# Patient Record
Sex: MALE | Race: WHITE | NOT HISPANIC OR LATINO | Employment: FULL TIME | URBAN - METROPOLITAN AREA
[De-identification: names, ages, dates, MRNs, and addresses within clinical notes are randomized per-mention and may not be internally consistent; named-entity substitution may affect disease eponyms.]

---

## 2019-04-25 ENCOUNTER — OFFICE VISIT (OUTPATIENT)
Dept: PRIMARY CARE CLINIC | Facility: CLINIC | Age: 24
End: 2019-04-25
Payer: COMMERCIAL

## 2019-04-25 VITALS
OXYGEN SATURATION: 99 % | WEIGHT: 179 LBS | DIASTOLIC BLOOD PRESSURE: 85 MMHG | SYSTOLIC BLOOD PRESSURE: 130 MMHG | RESPIRATION RATE: 18 BRPM | TEMPERATURE: 98 F | HEIGHT: 66 IN | HEART RATE: 66 BPM | BODY MASS INDEX: 28.77 KG/M2
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
## 2019-04-25 DIAGNOSIS — S40.861A: Primary | ICD-10-CM

## 2019-04-25 PROCEDURE — 99999 PR PBB SHADOW E&M-EST. PATIENT-LVL III: CPT | Mod: PBBFAC,,, | Performed by: FAMILY MEDICINE

## 2019-04-25 PROCEDURE — 3008F PR BODY MASS INDEX (BMI) DOCUMENTED: ICD-10-PCS | Mod: CPTII,S$GLB,, | Performed by: FAMILY MEDICINE

## 2019-04-25 PROCEDURE — 99213 OFFICE O/P EST LOW 20 MIN: CPT | Mod: S$GLB,,, | Performed by: FAMILY MEDICINE

## 2019-04-25 PROCEDURE — 3008F BODY MASS INDEX DOCD: CPT | Mod: CPTII,S$GLB,, | Performed by: FAMILY MEDICINE

## 2019-04-25 PROCEDURE — 99999 PR PBB SHADOW E&M-EST. PATIENT-LVL III: ICD-10-PCS | Mod: PBBFAC,,, | Performed by: FAMILY MEDICINE

## 2019-04-25 PROCEDURE — 99213 PR OFFICE/OUTPT VISIT, EST, LEVL III, 20-29 MIN: ICD-10-PCS | Mod: S$GLB,,, | Performed by: FAMILY MEDICINE

## 2019-04-25 RX ORDER — MINOCYCLINE HYDROCHLORIDE 100 MG/1
100 CAPSULE ORAL 2 TIMES DAILY
Qty: 20 CAPSULE | Refills: 0
Start: 2019-04-25 | End: 2019-05-05

## 2019-04-25 NOTE — PROGRESS NOTES
"Subjective:       Patient ID: Salinas Wasserman is a 24 y.o. male.    Chief Complaint: Rash (right, upper arm rash/cellulitis )    Developed redness and itching to right upper arm yesterday, larger today.  Minimal discomfort.  No fevers or chills.  No known trauma, but spends lot of time outside, so thinks he could of been bitten or stung by something.  Has not taken anything for it yet.  No significant history of Infectious Disease.    Review of Systems   Constitutional: Negative for fever.   Respiratory: Negative for shortness of breath.    Cardiovascular: Negative for chest pain.   Gastrointestinal: Negative for nausea and vomiting.   Skin: Positive for color change and rash.   Allergic/Immunologic: Negative for immunocompromised state.       Objective:      Vitals:    04/25/19 1157   BP: 130/85   BP Location: Left arm   Patient Position: Sitting   BP Method: Large (Automatic)   Pulse: 66   Resp: 18   Temp: 98 °F (36.7 °C)   TempSrc: Oral   SpO2: 99%   Weight: 81.2 kg (179 lb)   Height: 5' 6" (1.676 m)     Physical Exam   Constitutional: He is oriented to person, place, and time. He appears well-developed and well-nourished.   HENT:   Head: Normocephalic and atraumatic.   Cardiovascular: Normal rate, regular rhythm and normal heart sounds.   Pulmonary/Chest: Effort normal and breath sounds normal.   Musculoskeletal: He exhibits no edema.   Neurological: He is alert and oriented to person, place, and time.   Skin: Skin is warm and dry.        Nursing note and vitals reviewed.      Assessment:       1. Insect bite of upper arm, right, initial encounter        Plan:       Insect bite of upper arm, right, initial encounter  -     minocycline (MINOCIN,DYNACIN) 100 MG capsule; Take 1 capsule (100 mg total) by mouth 2 (two) times daily. for 10 days  Dispense: 20 capsule; Refill: 0  Likely localized allergic/hypersensitivity reaction.  Advised to take Claritin/Zyrtec daily, as well as Benadryl at night for the next few " days.  Patient given prescription of minocycline with instructions to fill and start taking if he develops increasing pain, worsening redness, fever or any other concerning symptoms.    Medication List with Changes/Refills   New Medications    MINOCYCLINE (MINOCIN,DYNACIN) 100 MG CAPSULE    Take 1 capsule (100 mg total) by mouth 2 (two) times daily. for 10 days   Discontinued Medications    IBUPROFEN (ADVIL,MOTRIN) 600 MG TABLET    Take 1 tablet (600 mg total) by mouth every 6 (six) hours as needed for Pain.

## 2020-10-05 ENCOUNTER — PATIENT MESSAGE (OUTPATIENT)
Dept: ADMINISTRATIVE | Facility: HOSPITAL | Age: 25
End: 2020-10-05

## 2021-01-04 ENCOUNTER — PATIENT MESSAGE (OUTPATIENT)
Dept: ADMINISTRATIVE | Facility: HOSPITAL | Age: 26
End: 2021-01-04

## 2021-04-05 ENCOUNTER — PATIENT MESSAGE (OUTPATIENT)
Dept: ADMINISTRATIVE | Facility: HOSPITAL | Age: 26
End: 2021-04-05

## 2021-05-12 ENCOUNTER — PATIENT MESSAGE (OUTPATIENT)
Dept: RESEARCH | Facility: HOSPITAL | Age: 26
End: 2021-05-12

## 2021-07-06 ENCOUNTER — PATIENT MESSAGE (OUTPATIENT)
Dept: ADMINISTRATIVE | Facility: HOSPITAL | Age: 26
End: 2021-07-06

## 2021-10-05 ENCOUNTER — PATIENT MESSAGE (OUTPATIENT)
Dept: ADMINISTRATIVE | Facility: HOSPITAL | Age: 26
End: 2021-10-05

## 2022-01-21 ENCOUNTER — PATIENT MESSAGE (OUTPATIENT)
Dept: ADMINISTRATIVE | Facility: HOSPITAL | Age: 27
End: 2022-01-21
Payer: COMMERCIAL

## 2022-10-17 ENCOUNTER — HOSPITAL ENCOUNTER (EMERGENCY)
Facility: HOSPITAL | Age: 27
Discharge: HOME | End: 2022-10-17
Attending: STUDENT IN AN ORGANIZED HEALTH CARE EDUCATION/TRAINING PROGRAM
Payer: COMMERCIAL

## 2022-10-17 VITALS
DIASTOLIC BLOOD PRESSURE: 74 MMHG | OXYGEN SATURATION: 100 % | HEIGHT: 66 IN | SYSTOLIC BLOOD PRESSURE: 130 MMHG | WEIGHT: 200 LBS | RESPIRATION RATE: 18 BRPM | TEMPERATURE: 98 F | BODY MASS INDEX: 32.14 KG/M2 | HEART RATE: 70 BPM

## 2022-10-17 DIAGNOSIS — R31.9 HEMATURIA, UNSPECIFIED TYPE: ICD-10-CM

## 2022-10-17 DIAGNOSIS — N20.0 KIDNEY STONE: Primary | ICD-10-CM

## 2022-10-17 DIAGNOSIS — R10.9 FLANK PAIN: ICD-10-CM

## 2022-10-17 LAB
ALBUMIN SERPL-MCNC: 4.9 GM/DL (ref 3.5–5)
ALBUMIN/GLOB SERPL: 1.7 RATIO (ref 1.1–2)
ALP SERPL-CCNC: 48 UNIT/L (ref 40–150)
ALT SERPL-CCNC: 40 UNIT/L (ref 0–55)
APPEARANCE UR: ABNORMAL
AST SERPL-CCNC: 23 UNIT/L (ref 5–34)
BACTERIA #/AREA URNS AUTO: ABNORMAL /HPF
BASOPHILS # BLD AUTO: 0.06 X10(3)/MCL (ref 0–0.2)
BASOPHILS NFR BLD AUTO: 0.6 %
BILIRUB UR QL STRIP.AUTO: NEGATIVE MG/DL
BILIRUBIN DIRECT+TOT PNL SERPL-MCNC: 0.9 MG/DL
BUN SERPL-MCNC: 13.7 MG/DL (ref 8.9–20.6)
C TRACH DNA SPEC QL NAA+PROBE: NOT DETECTED
CALCIUM SERPL-MCNC: 10.1 MG/DL (ref 8.4–10.2)
CHLORIDE SERPL-SCNC: 107 MMOL/L (ref 98–107)
CO2 SERPL-SCNC: 22 MMOL/L (ref 22–29)
COLOR UR AUTO: YELLOW
CREAT SERPL-MCNC: 1.27 MG/DL (ref 0.73–1.18)
EOSINOPHIL # BLD AUTO: 0.06 X10(3)/MCL (ref 0–0.9)
EOSINOPHIL NFR BLD AUTO: 0.6 %
ERYTHROCYTE [DISTWIDTH] IN BLOOD BY AUTOMATED COUNT: 12.1 % (ref 11.5–17)
GFR SERPLBLD CREATININE-BSD FMLA CKD-EPI: >60 MLS/MIN/1.73/M2
GLOBULIN SER-MCNC: 2.9 GM/DL (ref 2.4–3.5)
GLUCOSE SERPL-MCNC: 113 MG/DL (ref 74–100)
GLUCOSE UR QL STRIP.AUTO: NEGATIVE MG/DL
HCT VFR BLD AUTO: 46.2 % (ref 42–52)
HGB BLD-MCNC: 16.5 GM/DL (ref 14–18)
IMM GRANULOCYTES # BLD AUTO: 0.02 X10(3)/MCL (ref 0–0.04)
IMM GRANULOCYTES NFR BLD AUTO: 0.2 %
KETONES UR QL STRIP.AUTO: NEGATIVE MG/DL
LEUKOCYTE ESTERASE UR QL STRIP.AUTO: NEGATIVE UNIT/L
LIPASE SERPL-CCNC: 16 U/L
LYMPHOCYTES # BLD AUTO: 1.61 X10(3)/MCL (ref 0.6–4.6)
LYMPHOCYTES NFR BLD AUTO: 15.9 %
MCH RBC QN AUTO: 30.1 PG (ref 27–31)
MCHC RBC AUTO-ENTMCNC: 35.7 MG/DL (ref 33–36)
MCV RBC AUTO: 84.2 FL (ref 80–94)
MONOCYTES # BLD AUTO: 0.61 X10(3)/MCL (ref 0.1–1.3)
MONOCYTES NFR BLD AUTO: 6 %
N GONORRHOEA DNA SPEC QL NAA+PROBE: NOT DETECTED
NEUTROPHILS # BLD AUTO: 7.8 X10(3)/MCL (ref 2.1–9.2)
NEUTROPHILS NFR BLD AUTO: 76.7 %
NITRITE UR QL STRIP.AUTO: NEGATIVE
NRBC BLD AUTO-RTO: 0 %
PH UR STRIP.AUTO: 8 [PH]
PLATELET # BLD AUTO: 201 X10(3)/MCL (ref 130–400)
PMV BLD AUTO: 10.4 FL (ref 7.4–10.4)
POTASSIUM SERPL-SCNC: 4.6 MMOL/L (ref 3.5–5.1)
PROT SERPL-MCNC: 7.8 GM/DL (ref 6.4–8.3)
PROT UR QL STRIP.AUTO: ABNORMAL MG/DL
RBC # BLD AUTO: 5.49 X10(6)/MCL (ref 4.7–6.1)
RBC #/AREA URNS AUTO: 214 /HPF
RBC UR QL AUTO: ABNORMAL UNIT/L
SODIUM SERPL-SCNC: 137 MMOL/L (ref 136–145)
SP GR UR STRIP.AUTO: 1.02 (ref 1–1.03)
SQUAMOUS #/AREA URNS AUTO: <5 /HPF
UROBILINOGEN UR STRIP-ACNC: 0.2 MG/DL
WBC # SPEC AUTO: 10.1 X10(3)/MCL (ref 4.5–11.5)
WBC #/AREA URNS AUTO: <5 /HPF

## 2022-10-17 PROCEDURE — 83690 ASSAY OF LIPASE: CPT | Performed by: PHYSICIAN ASSISTANT

## 2022-10-17 PROCEDURE — 80053 COMPREHEN METABOLIC PANEL: CPT | Performed by: PHYSICIAN ASSISTANT

## 2022-10-17 PROCEDURE — 25000003 PHARM REV CODE 250: Performed by: PHYSICIAN ASSISTANT

## 2022-10-17 PROCEDURE — 63600175 PHARM REV CODE 636 W HCPCS: Performed by: PHYSICIAN ASSISTANT

## 2022-10-17 PROCEDURE — 87491 CHLMYD TRACH DNA AMP PROBE: CPT | Performed by: PHYSICIAN ASSISTANT

## 2022-10-17 PROCEDURE — 87591 N.GONORRHOEAE DNA AMP PROB: CPT | Performed by: PHYSICIAN ASSISTANT

## 2022-10-17 PROCEDURE — 96375 TX/PRO/DX INJ NEW DRUG ADDON: CPT

## 2022-10-17 PROCEDURE — 96374 THER/PROPH/DIAG INJ IV PUSH: CPT

## 2022-10-17 PROCEDURE — 85025 COMPLETE CBC W/AUTO DIFF WBC: CPT | Performed by: PHYSICIAN ASSISTANT

## 2022-10-17 PROCEDURE — 81001 URINALYSIS AUTO W/SCOPE: CPT | Performed by: PHYSICIAN ASSISTANT

## 2022-10-17 PROCEDURE — 36415 COLL VENOUS BLD VENIPUNCTURE: CPT | Performed by: PHYSICIAN ASSISTANT

## 2022-10-17 PROCEDURE — 96361 HYDRATE IV INFUSION ADD-ON: CPT

## 2022-10-17 PROCEDURE — 99285 EMERGENCY DEPT VISIT HI MDM: CPT | Mod: 25

## 2022-10-17 PROCEDURE — 63600175 PHARM REV CODE 636 W HCPCS: Performed by: STUDENT IN AN ORGANIZED HEALTH CARE EDUCATION/TRAINING PROGRAM

## 2022-10-17 RX ORDER — ONDANSETRON 4 MG/1
4 TABLET, ORALLY DISINTEGRATING ORAL EVERY 12 HOURS PRN
Qty: 10 TABLET | Refills: 0 | Status: SHIPPED | OUTPATIENT
Start: 2022-10-17 | End: 2022-10-22

## 2022-10-17 RX ORDER — ONDANSETRON 2 MG/ML
4 INJECTION INTRAMUSCULAR; INTRAVENOUS
Status: COMPLETED | OUTPATIENT
Start: 2022-10-17 | End: 2022-10-17

## 2022-10-17 RX ORDER — MORPHINE SULFATE 4 MG/ML
4 INJECTION, SOLUTION INTRAMUSCULAR; INTRAVENOUS
Status: COMPLETED | OUTPATIENT
Start: 2022-10-17 | End: 2022-10-17

## 2022-10-17 RX ORDER — HYDROCODONE BITARTRATE AND ACETAMINOPHEN 5; 325 MG/1; MG/1
1 TABLET ORAL EVERY 8 HOURS PRN
Qty: 15 TABLET | Refills: 0 | Status: SHIPPED | OUTPATIENT
Start: 2022-10-17 | End: 2022-10-22

## 2022-10-17 RX ORDER — TAMSULOSIN HYDROCHLORIDE 0.4 MG/1
0.4 CAPSULE ORAL DAILY
Qty: 30 CAPSULE | Refills: 0 | Status: SHIPPED | OUTPATIENT
Start: 2022-10-17 | End: 2023-05-16

## 2022-10-17 RX ORDER — KETOROLAC TROMETHAMINE 30 MG/ML
30 INJECTION, SOLUTION INTRAMUSCULAR; INTRAVENOUS
Status: COMPLETED | OUTPATIENT
Start: 2022-10-17 | End: 2022-10-17

## 2022-10-17 RX ORDER — KETOROLAC TROMETHAMINE 10 MG/1
10 TABLET, FILM COATED ORAL EVERY 8 HOURS PRN
Qty: 15 TABLET | Refills: 0 | Status: SHIPPED | OUTPATIENT
Start: 2022-10-17 | End: 2022-10-22

## 2022-10-17 RX ADMIN — KETOROLAC TROMETHAMINE 30 MG: 30 INJECTION, SOLUTION INTRAMUSCULAR at 10:10

## 2022-10-17 RX ADMIN — MORPHINE SULFATE 4 MG: 4 INJECTION INTRAVENOUS at 11:10

## 2022-10-17 RX ADMIN — SODIUM CHLORIDE 1000 ML: 9 INJECTION, SOLUTION INTRAVENOUS at 10:10

## 2022-10-17 RX ADMIN — ONDANSETRON 4 MG: 2 INJECTION INTRAMUSCULAR; INTRAVENOUS at 10:10

## 2022-10-17 RX ADMIN — SODIUM CHLORIDE, POTASSIUM CHLORIDE, SODIUM LACTATE AND CALCIUM CHLORIDE 1000 ML: 600; 310; 30; 20 INJECTION, SOLUTION INTRAVENOUS at 12:10

## 2022-10-17 NOTE — ED PROVIDER NOTES
Encounter Date: 10/17/2022    SCRIBE #1 NOTE: I, Bhumika Rose, am scribing for, and in the presence of,  Marlo Bennett MD. I have scribed the following portions of the note - Other sections scribed: HPI, ROS, PE.     History     Chief Complaint   Patient presents with    Abdominal Pain     C/o RUQ abdominal pain and right flank pain that began at 0300. Reports decreased urine output, right scrotal pressure, and nausea. Pt denies vomiting.      27 year old male presents to the ED for right flank and right upper quadrant abdominal pain that began this morning around 0300. The patient reports associated nausea and vomiting, but denies hematuria. He does not have a history of kidney stones.     The history is provided by the patient. No  was used.   Abdominal Pain  Illness onset: 0300. The abdominal pain is located in the RUQ. The abdominal pain radiates to the right flank. The abdominal pain is relieved by nothing. The other symptoms of the illness include nausea and vomiting. The other symptoms of the illness do not include fever, shortness of breath or dysuria.   The emesis contains stomach contents.   Symptoms associated with the illness do not include hematuria.   Review of patient's allergies indicates:  No Known Allergies  History reviewed. No pertinent past medical history.  History reviewed. No pertinent surgical history.  Family History   Problem Relation Age of Onset    Stroke Father     Hypertension Father     Stroke Paternal Grandmother      Social History     Tobacco Use    Smoking status: Some Days     Packs/day: 0.50     Types: Cigarettes    Smokeless tobacco: Never   Substance Use Topics    Alcohol use: Yes     Alcohol/week: 2.0 - 3.0 standard drinks     Types: 2 - 3 Cans of beer per week     Comment: social     Drug use: No     Review of Systems   Constitutional:  Negative for fever.   HENT:  Negative for sore throat.    Eyes:  Negative for visual disturbance.   Respiratory:   Negative for shortness of breath.    Cardiovascular:  Negative for chest pain.   Gastrointestinal:  Positive for abdominal pain, nausea and vomiting.   Genitourinary:  Negative for dysuria and hematuria.   Musculoskeletal:  Negative for joint swelling.   Skin:  Negative for rash.   Neurological:  Negative for weakness.   Psychiatric/Behavioral:  Negative for confusion.    All other systems reviewed and are negative.    Physical Exam     Initial Vitals [10/17/22 1027]   BP Pulse Resp Temp SpO2   (!) 149/93 (!) 56 20 97.9 °F (36.6 °C) 99 %      MAP       --         Physical Exam    Nursing note and vitals reviewed.  Constitutional: He appears well-developed and well-nourished. No distress.   HENT:   Head: Normocephalic and atraumatic.   Nose: Nose normal.   Mouth/Throat: Oropharynx is clear and moist.   Eyes: Conjunctivae and EOM are normal. Pupils are equal, round, and reactive to light.   Neck: Neck supple. No tracheal deviation present.   Normal range of motion.  Cardiovascular:  Normal rate, regular rhythm, normal heart sounds and intact distal pulses.           No murmur heard.  Pulmonary/Chest: Breath sounds normal. No stridor. No respiratory distress. He has no wheezes. He has no rhonchi. He has no rales. He exhibits no tenderness.   Abdominal: Abdomen is soft. Bowel sounds are normal. He exhibits no distension and no mass. There is no abdominal tenderness. There is no rebound and no guarding.   Genitourinary:    Genitourinary Comments: No testicular pain or swelling, no erythema      Musculoskeletal:         General: No edema. Normal range of motion.      Cervical back: Normal range of motion and neck supple.      Comments: Right flank tenderness      Neurological: He is alert and oriented to person, place, and time. He has normal strength. No cranial nerve deficit or sensory deficit. GCS score is 15. GCS eye subscore is 4. GCS verbal subscore is 5. GCS motor subscore is 6.   Skin: Skin is warm and dry.  Capillary refill takes less than 2 seconds. No rash noted. No pallor.   Psychiatric: He has a normal mood and affect. His behavior is normal. Judgment and thought content normal.       ED Course   Procedures  Labs Reviewed   COMPREHENSIVE METABOLIC PANEL - Abnormal; Notable for the following components:       Result Value    Glucose Level 113 (*)     Creatinine 1.27 (*)     All other components within normal limits   URINALYSIS, REFLEX TO URINE CULTURE - Abnormal; Notable for the following components:    Appearance, UA Turbid (*)     Protein, UA Trace (*)     Blood, UA 3+ (*)     All other components within normal limits   URINALYSIS, MICROSCOPIC - Abnormal; Notable for the following components:    RBC,  (*)     All other components within normal limits   CHLAMYDIA/GONORRHOEAE(GC), PCR - Normal    Narrative:     The Xpert CT/NG test, performed on the [employer] system is a qualitative in vitro real-time polymerase chain reaction (PCR) test for the automated detected and differentiation for genomic DNA from Chlamydia trachomatis (CT) and/or Neisseria gonorrhoeae (NG).   LIPASE - Normal   CBC W/ AUTO DIFFERENTIAL    Narrative:     The following orders were created for panel order CBC auto differential.  Procedure                               Abnormality         Status                     ---------                               -----------         ------                     CBC with Differential[[id number]]                            Final result                 Please view results for these tests on the individual orders.   CBC WITH DIFFERENTIAL          Imaging Results              CT Abdomen Pelvis  Without Contrast (Final result)  Result time 10/17/22 13:55:58      Final result by Marti Pendleton MD (10/17/22 13:55:58)                   Impression:      2 mm calculus at the right UVJ with mild right hydronephrosis and inflammatory changes.      Electronically signed by: Marti  Helder  Date:    10/17/2022  Time:    13:55               Narrative:    EXAMINATION:  CT ABDOMEN PELVIS WITHOUT CONTRAST    CLINICAL HISTORY:  Flank pain, kidney stone;    TECHNIQUE:  CT imaging was performed of the abdomen and pelvis without intravenous contrast. Dose length product is 370 mGycm. Automatic exposure control, adjustment of mA/kV or iterative reconstruction technique was used to limit radiation dose.    COMPARISON:  None    FINDINGS:  Assessment of the visceral organs and vasculature is limited by the lack of IV contrast.    Liver/biliary: No concerning hepatic findings. No radiodense gallstone or biliary dilatation appreciated.    Pancreas: Normal.    Spleen: Normal.    Adrenals: Normal.    Kidneys, ureters and bladder: There is a 2 mm calculus at the right ureterovesicular junction.  There is mild right hydronephrosis and ureteral dilatation with perinephric inflammatory changes.  The bladder is unremarkable.    Reproductive organs: No pelvic masses.    Stomach/bowel: No evidence of bowel obstruction. Normal appendix. No discernible bowel inflammation.    Lymph nodes: No pathologically enlarged lymph node identified with noncontrast technique.    Peritoneum: No ascites or free air.    Vessels: Normal abdominal aortic caliber.    Abdominal wall: Normal.    Lung bases: No consolidation or pleural effusion.    Bones: No acute osseous findings.                                       Medications   sodium chloride 0.9% bolus 1,000 mL (0 mLs Intravenous Stopped 10/17/22 1145)   ondansetron injection 4 mg (4 mg Intravenous Given by Other 10/17/22 1045)   ketorolac injection 30 mg (30 mg Intravenous Given by Other 10/17/22 1045)   morphine injection 4 mg (4 mg Intravenous Given 10/17/22 1159)   lactated ringers bolus 1,000 mL (1,000 mLs Intravenous New Bag 10/17/22 1200)     Medical Decision Making:   Clinical Tests:   Lab Tests: Ordered and Reviewed  Radiological Study: Reviewed and Ordered  ED  Management:  Patient is a 26 y/o male presents for R flank pain.  See HPI/exam.  Labs/imaging as noted.  Kidney stone noted.  Reassessed patient.  Patient is resting comfortably.  Discussed all results.  Discussed need for follow-up with urology.  Discussed return precautions.  Answered all questions at this time.  Hemodynamically stable for continued outpatient management with strict return precautions.  Patient and family verbalized understanding agreed to plan.          Scribe Attestation:   Scribe #1: I performed the above scribed service and the documentation accurately describes the services I performed. I attest to the accuracy of the note.    Attending Attestation:           Physician Attestation for Scribe:  Physician Attestation Statement for Scribe #1: I, reviewed documentation, as scribed by Bhumika Rose in my presence, and it is both accurate and complete.                        Clinical Impression:   Final diagnoses:  [N20.0] Kidney stone (Primary)  [R10.9] Flank pain  [R31.9] Hematuria, unspecified type        ED Disposition Condition    Discharge Stable          ED Prescriptions       Medication Sig Dispense Start Date End Date Auth. Provider    tamsulosin (FLOMAX) 0.4 mg Cap Take 1 capsule (0.4 mg total) by mouth once daily. 30 capsule 10/17/2022 2022 Marlo Bennett MD    ketorolac (TORADOL) 10 mg tablet () Take 1 tablet (10 mg total) by mouth every 8 (eight) hours as needed for Pain. 15 tablet 10/17/2022 10/22/2022 Marlo Bennett MD    ondansetron (ZOFRAN-ODT) 4 MG TbDL () Take 1 tablet (4 mg total) by mouth every 12 (twelve) hours as needed. 10 tablet 10/17/2022 10/22/2022 Marlo Bennett MD    HYDROcodone-acetaminophen (NORCO) 5-325 mg per tablet () Take 1 tablet by mouth every 8 (eight) hours as needed for Pain. 15 tablet 10/17/2022 10/22/2022 Marlo Bennett MD          Follow-up Information       Follow up With Specialties Details Why Contact Info    Nichol Crowe  MD Family Medicine Schedule an appointment as soon as possible for a visit in 1 day  [address]  [address]  [facility] [place] [facility] [address]  [phone]      Your primary care physician.                 Marlo Bennett MD  10/31/22 0963

## 2022-10-17 NOTE — FIRST PROVIDER EVALUATION
"Medical screening examination initiated.  I have conducted a focused provider triage encounter, findings are as follows:    Chief Complaint   Patient presents with    Abdominal Pain     C/o RUQ abdominal pain and right flank pain that began at 0300. Reports decreased urine output, right scrotal pressure, and nausea. Pt denies vomiting.      Brief history of present illness:  27 y.o. male presents to the ED with right flank pain radiating to RLQ pain onset 0300 this morning with associated nausea. Notes discomfort in his testicle as well (L>R).  Denies dysuria, hematuria, vomiting. No history of kidney stones.     Vitals:    10/17/22 1027   BP: (!) 149/93   Pulse: (!) 56   Resp: 20   Temp: 97.9 °F (36.6 °C)   SpO2: 99%   Weight: 90.7 kg (200 lb)   Height: 5' 6" (1.676 m)     Pertinent physical exam: Awake, alert, ambulatory, non-labored respirations    Brief workup plan:  labs, UA, US    Preliminary workup initiated; this workup will be continued and followed by the physician or advanced practice provider that is assigned to the patient when roomed.  "

## 2022-10-17 NOTE — DISCHARGE INSTRUCTIONS
Follow up with your primary care physician.      Return to the with urology.      Return to the emergency department given worsening pain, nausea, vomiting, difficulty breathing, fever, or any other symptoms.

## 2023-05-09 DIAGNOSIS — Z00.00 GENERAL MEDICAL EXAM: Primary | ICD-10-CM

## 2023-05-16 ENCOUNTER — CLINICAL SUPPORT (OUTPATIENT)
Dept: INTERNAL MEDICINE | Facility: CLINIC | Age: 28
End: 2023-05-16
Payer: COMMERCIAL

## 2023-05-16 VITALS
OXYGEN SATURATION: 100 % | SYSTOLIC BLOOD PRESSURE: 133 MMHG | DIASTOLIC BLOOD PRESSURE: 89 MMHG | TEMPERATURE: 98 F | HEART RATE: 71 BPM | RESPIRATION RATE: 19 BRPM

## 2023-05-16 DIAGNOSIS — J30.9 ALLERGIC RHINITIS, UNSPECIFIED SEASONALITY, UNSPECIFIED TRIGGER: ICD-10-CM

## 2023-05-16 DIAGNOSIS — R06.83 SNORING: ICD-10-CM

## 2023-05-16 DIAGNOSIS — R21 RASH AND NONSPECIFIC SKIN ERUPTION: ICD-10-CM

## 2023-05-16 DIAGNOSIS — R12 HEART BURN: ICD-10-CM

## 2023-05-16 DIAGNOSIS — Z00.00 GENERAL MEDICAL EXAM: ICD-10-CM

## 2023-05-16 DIAGNOSIS — F17.210 CIGARETTE SMOKER: ICD-10-CM

## 2023-05-16 DIAGNOSIS — J34.2 DEVIATED NASAL SEPTUM: ICD-10-CM

## 2023-05-16 LAB
(HCYS)2 SERPL-MCNC: 9.2 UMOL/L (ref 5.1–15.4)
ALBUMIN SERPL-MCNC: 4.1 G/DL (ref 3.5–5)
ALBUMIN/GLOB SERPL: 1.3 RATIO (ref 1.1–2)
ALP SERPL-CCNC: 39 UNIT/L (ref 40–150)
ALT SERPL-CCNC: 47 UNIT/L (ref 0–55)
APPEARANCE UR: CLEAR
AST SERPL-CCNC: 25 UNIT/L (ref 5–34)
BACTERIA #/AREA URNS AUTO: ABNORMAL /HPF
BASOPHILS # BLD AUTO: 0.06 X10(3)/MCL
BASOPHILS NFR BLD AUTO: 1 %
BILIRUB UR QL STRIP.AUTO: NEGATIVE MG/DL
BILIRUBIN DIRECT+TOT PNL SERPL-MCNC: 0.6 MG/DL
BUN SERPL-MCNC: 12.1 MG/DL (ref 8.9–20.6)
CALCIUM SERPL-MCNC: 10 MG/DL (ref 8.4–10.2)
CHLORIDE SERPL-SCNC: 103 MMOL/L (ref 98–107)
CHOLEST SERPL-MCNC: 214 MG/DL
CHOLEST/HDLC SERPL: 5 {RATIO} (ref 0–5)
CK SERPL-CCNC: 80 U/L (ref 30–200)
CO2 SERPL-SCNC: 26 MMOL/L (ref 22–29)
COLOR UR: ABNORMAL
CREAT SERPL-MCNC: 1.03 MG/DL (ref 0.73–1.18)
CRP SERPL HS-MCNC: 1.09 MG/L
DEPRECATED CALCIDIOL+CALCIFEROL SERPL-MC: 90.8 NG/ML (ref 30–80)
DLCO: NORMAL
EOSINOPHIL # BLD AUTO: 0.2 X10(3)/MCL (ref 0–0.9)
EOSINOPHIL NFR BLD AUTO: 3.3 %
ERYTHROCYTE [DISTWIDTH] IN BLOOD BY AUTOMATED COUNT: 12.2 % (ref 11.5–17)
EST. AVERAGE GLUCOSE BLD GHB EST-MCNC: 91.1 MG/DL
FERRITIN SERPL-MCNC: 115.99 NG/ML (ref 21.81–274.66)
FEV1/FVC: 81.2 %
FEV1: 3.5 LITERS
FVC: 34.31 LITERS
GFR SERPLBLD CREATININE-BSD FMLA CKD-EPI: >60 MLS/MIN/1.73/M2
GLOBULIN SER-MCNC: 3.2 GM/DL (ref 2.4–3.5)
GLUCOSE SERPL-MCNC: 88 MG/DL (ref 74–100)
GLUCOSE UR QL STRIP.AUTO: NORMAL MG/DL
GROUP & RH: NORMAL
HBA1C MFR BLD: 4.8 %
HCT VFR BLD AUTO: 43.9 % (ref 42–52)
HCV AB SERPL QL IA: NONREACTIVE
HDLC SERPL-MCNC: 40 MG/DL (ref 35–60)
HGB BLD-MCNC: 15.5 G/DL (ref 14–18)
HIV 1+2 AB+HIV1 P24 AG SERPL QL IA: NONREACTIVE
HYALINE CASTS #/AREA URNS LPF: ABNORMAL /LPF
IMM GRANULOCYTES # BLD AUTO: 0.01 X10(3)/MCL (ref 0–0.04)
IMM GRANULOCYTES NFR BLD AUTO: 0.2 %
KETONES UR QL STRIP.AUTO: NEGATIVE MG/DL
LDLC SERPL CALC-MCNC: 143 MG/DL (ref 50–140)
LEUKOCYTE ESTERASE UR QL STRIP.AUTO: NEGATIVE UNIT/L
LYMPHOCYTES # BLD AUTO: 2.88 X10(3)/MCL (ref 0.6–4.6)
LYMPHOCYTES NFR BLD AUTO: 48.1 %
MCH RBC QN AUTO: 29.8 PG (ref 27–31)
MCHC RBC AUTO-ENTMCNC: 35.3 G/DL (ref 33–36)
MCV RBC AUTO: 84.3 FL (ref 80–94)
MONOCYTES # BLD AUTO: 0.44 X10(3)/MCL (ref 0.1–1.3)
MONOCYTES NFR BLD AUTO: 7.3 %
MUCOUS THREADS URNS QL MICRO: ABNORMAL /LPF
NEUTROPHILS # BLD AUTO: 2.4 X10(3)/MCL (ref 2.1–9.2)
NEUTROPHILS NFR BLD AUTO: 40.1 %
NITRITE UR QL STRIP.AUTO: NEGATIVE
NRBC BLD AUTO-RTO: 0 %
PH UR STRIP.AUTO: 5.5 [PH]
PLATELET # BLD AUTO: 168 X10(3)/MCL (ref 130–400)
PMV BLD AUTO: 10.9 FL (ref 7.4–10.4)
POTASSIUM SERPL-SCNC: 4.4 MMOL/L (ref 3.5–5.1)
PROT SERPL-MCNC: 7.3 GM/DL (ref 6.4–8.3)
PROT UR QL STRIP.AUTO: NEGATIVE MG/DL
PSA SERPL-MCNC: 0.18 NG/ML
RBC # BLD AUTO: 5.21 X10(6)/MCL (ref 4.7–6.1)
RBC #/AREA URNS AUTO: ABNORMAL /HPF
RBC UR QL AUTO: NEGATIVE UNIT/L
SODIUM SERPL-SCNC: 137 MMOL/L (ref 136–145)
SP GR UR STRIP.AUTO: 1.02
SQUAMOUS #/AREA URNS LPF: ABNORMAL /HPF
T3FREE SERPL-MCNC: 3.29 PG/ML (ref 1.57–3.91)
T4 FREE SERPL-MCNC: 1.41 NG/DL (ref 0.7–1.48)
TESTOST SERPL-MCNC: 829.05 NG/DL (ref 240.24–870.68)
TLC: NORMAL
TRIGL SERPL-MCNC: 156 MG/DL (ref 34–140)
TSH SERPL-ACNC: 1.25 UIU/ML (ref 0.35–4.94)
URATE SERPL-MCNC: 6.4 MG/DL (ref 3.5–7.2)
UROBILINOGEN UR STRIP-ACNC: NORMAL MG/DL
VLDLC SERPL CALC-MCNC: 31 MG/DL
WBC # SPEC AUTO: 5.99 X10(3)/MCL (ref 4.5–11.5)
WBC #/AREA URNS AUTO: ABNORMAL /HPF

## 2023-05-16 PROCEDURE — 0358T BIA WHOLE BODY: CPT | Mod: ,,, | Performed by: FAMILY MEDICINE

## 2023-05-16 PROCEDURE — 87389 HIV-1 AG W/HIV-1&-2 AB AG IA: CPT

## 2023-05-16 PROCEDURE — 84481 FREE ASSAY (FT-3): CPT

## 2023-05-16 PROCEDURE — 80061 LIPID PANEL: CPT

## 2023-05-16 PROCEDURE — 94010 BREATHING CAPACITY TEST: ICD-10-PCS | Mod: ,,, | Performed by: FAMILY MEDICINE

## 2023-05-16 PROCEDURE — 99499 UNLISTED E&M SERVICE: CPT | Mod: ,,, | Performed by: FAMILY MEDICINE

## 2023-05-16 PROCEDURE — 84439 ASSAY OF FREE THYROXINE: CPT

## 2023-05-16 PROCEDURE — 82728 ASSAY OF FERRITIN: CPT

## 2023-05-16 PROCEDURE — 93005 EKG 12-LEAD: ICD-10-PCS | Mod: 59,,, | Performed by: FAMILY MEDICINE

## 2023-05-16 PROCEDURE — 85025 COMPLETE CBC W/AUTO DIFF WBC: CPT

## 2023-05-16 PROCEDURE — 36415 COLL VENOUS BLD VENIPUNCTURE: CPT | Mod: ,,, | Performed by: FAMILY MEDICINE

## 2023-05-16 PROCEDURE — 86900 BLOOD TYPING SEROLOGIC ABO: CPT | Performed by: FAMILY MEDICINE

## 2023-05-16 PROCEDURE — 36415 PR COLLECTION VENOUS BLOOD,VENIPUNCTURE: ICD-10-PCS | Mod: ,,, | Performed by: FAMILY MEDICINE

## 2023-05-16 PROCEDURE — 93015 EXERCISE STRESS - EKG (CUPID ONLY): ICD-10-PCS | Mod: ,,, | Performed by: FAMILY MEDICINE

## 2023-05-16 PROCEDURE — 83090 ASSAY OF HOMOCYSTEINE: CPT

## 2023-05-16 PROCEDURE — 86141 C-REACTIVE PROTEIN HS: CPT

## 2023-05-16 PROCEDURE — 84403 ASSAY OF TOTAL TESTOSTERONE: CPT

## 2023-05-16 PROCEDURE — 84550 ASSAY OF BLOOD/URIC ACID: CPT

## 2023-05-16 PROCEDURE — 83036 HEMOGLOBIN GLYCOSYLATED A1C: CPT

## 2023-05-16 PROCEDURE — 84153 ASSAY OF PSA TOTAL: CPT

## 2023-05-16 PROCEDURE — 81001 URINALYSIS AUTO W/SCOPE: CPT

## 2023-05-16 PROCEDURE — 82306 VITAMIN D 25 HYDROXY: CPT

## 2023-05-16 PROCEDURE — 92551 HEARING SCREENING: ICD-10-PCS | Mod: ,,, | Performed by: FAMILY MEDICINE

## 2023-05-16 PROCEDURE — 93015 CV STRESS TEST SUPVJ I&R: CPT | Mod: ,,, | Performed by: FAMILY MEDICINE

## 2023-05-16 PROCEDURE — 99499 PR EXECUTIVE HEALTH VISIT WITH INSURANCE: ICD-10-PCS | Mod: ,,, | Performed by: FAMILY MEDICINE

## 2023-05-16 PROCEDURE — 0358T PR BIOELECTRICAL IMPEDANCE WHOLE BODY COMPOSITION ASSESSMENT W/I&R: ICD-10-PCS | Mod: ,,, | Performed by: FAMILY MEDICINE

## 2023-05-16 PROCEDURE — 82550 ASSAY OF CK (CPK): CPT

## 2023-05-16 PROCEDURE — 92551 PURE TONE HEARING TEST AIR: CPT | Mod: ,,, | Performed by: FAMILY MEDICINE

## 2023-05-16 PROCEDURE — 93010 EKG 12-LEAD: ICD-10-PCS | Mod: 59,,, | Performed by: FAMILY MEDICINE

## 2023-05-16 PROCEDURE — 93005 ELECTROCARDIOGRAM TRACING: CPT | Mod: 59,,, | Performed by: FAMILY MEDICINE

## 2023-05-16 PROCEDURE — 94010 BREATHING CAPACITY TEST: CPT | Mod: ,,, | Performed by: FAMILY MEDICINE

## 2023-05-16 PROCEDURE — 80053 COMPREHEN METABOLIC PANEL: CPT

## 2023-05-16 PROCEDURE — 86803 HEPATITIS C AB TEST: CPT

## 2023-05-16 PROCEDURE — 93010 ELECTROCARDIOGRAM REPORT: CPT | Mod: 59,,, | Performed by: FAMILY MEDICINE

## 2023-05-16 PROCEDURE — 84443 ASSAY THYROID STIM HORMONE: CPT

## 2023-05-16 NOTE — ASSESSMENT & PLAN NOTE
Weight loss should help resolve this issue. Consider a sleep study if your symptoms do not improve or worsen.

## 2023-05-16 NOTE — ASSESSMENT & PLAN NOTE
Consider returning to Dr. Erwin's office for further evaluation and treatment of your unknown rash.

## 2023-05-16 NOTE — PROGRESS NOTES
Subjective      Salinas Wasserman is a 28 y.o. year old male, who presents today for a preventive medical evaluation.      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   He states that up until last year he has had recurrent sinus infections which twice resulted in a pneumonia. Since July of 2022 he has been taking daily multivitamins and an antihistamine. He reports since then he has only had a stuffy nose a couple of times that resolved with the use of OTC nasal steroids.   He reports ringing in his left ear with some perceived hearing loss following a hunting trip where he did not use ear protection. This has improved some and occurred in 2022.   Over the last year he has experienced occasional heart burn relieved by TUMS and diarrhea.   He has longstanding anxiety and sees a counselor following the death of his father.   Father Lux reports seeing Dr. Erwin for an unusual a reaction that occurs when his skin is scratched. He was instructed to take a daily antihistamine. He states since starting this he has not noticed a difference.   He reports snoring that wakes him up. He experiences fatigue and sleepiness during the day as well.       CURRENT MEDICAL PROBLEMS  He has chronic allergic rhinitis for which he takes over the counter antihistamines. He has longstanding anxiety which is controlled without medications at this time.     Current Medications    Current Outpatient Medications:     multivitamin capsule, Take 1 capsule by mouth once daily., Disp: , Rfl:        PAST MEDICAL HISTORY  Immunizations:  He has his tetanus booster in the last 10 years.  He has not received the annual flu vaccine.  He reports he has not taken Hepatitis B vaccine.  He  has taken his Covid Vaccine.     Drug Allergies:  Review of patient's allergies indicates:  No Known Allergies     Medical Illnesses:  See Current Medical Problems and Review of System.    Surgical Illnesses:   History reviewed. No pertinent  surgical history.     Diagnostic Studies (year completed):  CT of Abdomen and Pelvis,       PERSONAL HABITS  He smokes 6 cigarettes a day and started in . This is a 3.3 pack year history. He uses smokeless tobacco 4 times a day since 2017.  He drinks approximately 7 alcoholic beverages per week. He does not drink caffeinated beverages. He reports that he is currently following a nonrestrictive diet. He eats majority of his meals outside of the home. He drinks 14 glasses of water a week. On average, he obtains 5-6 hours of sleep each night.    EXERCISE HISTORY  Currently, he exercises on average 1 day a week for 60 minutes each time. He occasionally plays tennis or attends a Cross Fit class. He admits that he is not consistent with his exercise routine. In the past he spent more time exercising and enjoyed activities such as playing basketball with peers and attending Cross Fit classes with friends. His new job assignments leave more challenges for him to find the time and space for exercise. He is motivated to restart an exercise routine.     STRESS FACTORS  The patient considers his home life to be moderate stress and his occupation to be of moderate stress. His greatest source of worry or concern is his health. He feels that he manages his stress not well most of the time. He rates his emotional outlook on life as generally happy. He rates his overall health as poor    FAMILY HISTORY  The patient's father  at the age of 78. He had hypertension, anxiety, spinal stenosis and a stroke. His mother is about 60 years old. She has hypertension, high cholesterol and scoliosis. He has 2 siblings, ages 34 and 33 who are both healthy.       Care Team:  Patient Care Team:  Nichol Crowe MD as PCP - General (Family Medicine)      Review of Systems     Constitutional: No fever, no chills, no sweats, no weakness, no fatigue.  Eye: No recent visual problems, no blurry vision, no visual disturbances.   ENT: No ear  pain, + change in hearing, + nasal congestion, no sore throat  Respiratory: No shortness of breath, no cough, no wheezing, + snoring.  Cardiovascular: No chest pain, no palpitations, no peripheral edema, no calf pain or swelling.  Gastrointestinal: No nausea, no vomiting, +occasional diarrhea, no constipation, no abdominal pain, no melena or rectal bleeding.  Male : No difficulty with urine stream, strength or flow rate. No polyuria, no nocturia, no sexual problems.   Genitourinary: No dysuria, no hematuria, no excessive urination, no frequent UTIs, no urinary incontinence  Immunologic: No recurrent fevers, no malaise  Hematology/lymphatics: No swollen lymph glands.   Musculoskeletal: No joint pain, no joint swelling, no trouble with gait.   Integumentary: + rashes, no skin lesions.   Neurologic: Alert, oriented ×4, no abnormal balance, no confusion, no numbness, no tingling, no headache  Psychiatric: + anxiety, no depression, + sleeping problems          Objective       Physical Exam     /89 (BP Location: Right arm, Patient Position: Sitting)   Pulse 71   Temp 98.1 °F (36.7 °C) (Oral)   Resp 19   SpO2 100%     General: Alert and oriented, no acute distress. well nourished, well kept, good hydration  Eye: Pupils are equal, round and reactive to light, extraocular movements are intact  HEENT: OP clear, moist mucus membranes, no cervical LAD, ear canals clear- normal TM bilaterally, +deviated septum to the left side with a narrowed nasal passage on the left side as well.   Respiratory: Lungs are clear to auscultation, breath sounds are equal and symmetric  Cardiovascular: Normal rate, regular rhythm, no murmurs, no gallop, no edema  Gastrointestinal: Soft, nontender, nondistended, normal bowel sounds, no organomegaly.  Musculoskeletal: Normal range of motion, no swelling, normal gait.   Integumentary: Warm, dry, pink, no rash.   Neurologic: Alert, oriented, no focal deficits, normal reflexes  Psychiatric:  Cooperative, appropriate mood and affect, normal judgment, nonsuicidal      Vision/Auditory  Hearing Screening    500Hz 1000Hz 2000Hz 4000Hz   Right ear 20 20 20 20   Left ear 20 20 20 20     Vision Screening    Right eye Left eye Both eyes   Without correction   20/30   With correction      Comments: Without Correction Alex  Far 20/30  Near 20/20      Spirometry   FVC 89.3% predictive  FEV1 88.1% predictive  FEV1/FVC 99.3% predictive  Interpretation: Normal Spirometry      Resting ECG: Rate 64 bpm; Normal Sinus Rhythm; Normal Axis, Incomplete Right bundle branch block  Impression: Resting electrocardiogram is borderline. There are no previous studies for comparison. .    Stress ECG: Indications for stress testing: Physical fitness assessment, development of an exercise prescription and cardiovascular risk assessment.  EKG and exercise stress test showed a resting heart rate prior to exam of 62 beats per minute, with a blood pressure of 139/75 at the start of the examination. Donnell protocol was used for evaluation of stress test. Initial EKG showed normal axis, ID, QRS, and QT intervals, demonstrating a normal EKG. The patient was able to undergo Donnell protocol for a total of 9:23, achieving a heart rate maximum of 164 beats per minute, which represents 86 % of predicted maximum. Pericardial leads V2 and V5 and standard lead II were monitored continuously throughout evaluation. All EKG leads were scanned periodically and reported at regular intervals. The patient experienced no chest pain or other cardiac symptoms of significance during or after the examination. The blood pressure response was normal throughout exercise and recovery. There were no electrocardiographic changes consistent with ischemia, and there were no significant abnormalities of patient rhythm.  Impression: Normal stress electrocardiogram with no significant signs of ST segment changes that would be consistent with obvious coronary  ischemia.    InBody Assessment:  BMI: 33.6  Percent Body Fat: 32.6 %  Visceral Fat Area: 127.6 cm2  Recommendations: Body fat mass loss 43.2lbs          Assessment      Impression & Plan:     Physical Exam: Blood pressure boarder line elevated during today's exam. The exam revealed a left sided deviated nasal septum.      Laboratory Results: Normal       Hearing Screening: Normal     Vision Screening: Normal     Lung Function Screening: Normal       ECG/Exercise Treadmill Test: See Impression above. Repeat in 1 year.     Cardiovascular Risk Assessment: Low Risk [place] score: 4.79%          Plan: Maintain a healthy blood pressure and weight. Follow a heart healthy diet and start a regular exercise routine. Stop Smoking.   Fish that contain Omega-3 fatty acids: Anchovy, salmon, tuna, mackerel, Bass, snapper, flounder, crab, oysters, shrimp, scallops. Other foods: milk, yogurt and eggs, walnuts, canola, soybean.  Do not exceed 2g/day from dietary supplements of Omega 3 Fatty acid.         Cancer Screening: None due at this time.        Immunizations:  Not all recommended immunizations are up to date. These include: Influenza vaccine and Hepatitis B vaccine, as well as the pneumococcal vaccine due to your smoking status       In Body Assessment: see results and recommendations above.      Plan: Exercise Prescription         Type: Aerobic: walking, stationary cycling, aquatic exercise, running         Frequency: 3-5 sessions/week         Intensity: Moderate (RPE: 10-13)          Time: 30 minutes (150 minutes/week)              Plus         Type: Strength/Resistance Training: exercises using resistance bands, weight machines, handheld  weights or body weight          Frequency: 2-3 session/week               Plus         Type: Flexibility training: pre/post workout stretching, yoga, kathleen chi         Frequency: 5-7 sessions/week         Time: 5-10 minutes          Plan: Diet recommendations: heart healthy diet such as  "the Mediterranean-style diet.         -Eat an overall healthy dietary pattern that emphasizes:   -a wide variety of fruits and vegetables   -whole grains and products made up mostly of whole grains   -healthy sources of protein (mostly plants such as legumes and nuts; fish and seafood; low fat or nonfat dairy; and , if you eat meat and poultry, ensuring it is lean and unprocessed)   -liquid non-tropical vegetable oils   -minimally processed foods   -minimized intake of added sugars   -food prepared with little or no salt   -limited or preferably no alcohol intake      TDEE: 2,386 calories  This is your "Total Daily Expenditure". This is the amount of calories required to maintain current body weight when your activity level is factored in.    Recommended daily calorie intake to promote weight loss with light exercise (1-2 days/week): 1,886 calories/day  Recommended daily calorie intake to promote weight loss with moderate exercise (3-5 days/week): 2,190 calories/day    BMR: 1,744 calories  This is your "Basal Metabolic Rate". This is the amount of calories required to maintain current body weight with no activity.    Final Recommendations:  Start a consistent exercise routine that incorporates aerobic, resistance and flexibility training. Follow a lower calorie, heart healthy diet. Shoot for a weight loss of 25 lbs over the next year with a focus on shrinking your waist line.   Monitor your blood pressure at home with a goal to stay under 140/90 and monitor your snoring and daytime fatigue as you lose weight.   Return for an InBody assessment in 12 weeks.   Stop smoking and consider cutting back on your alcohol intake.     Early identification and prevention are the keys to maintaining overall health and prevention of chronic diseases. For this reason, I recommend yearly physical examinations through this program or with your primary care physician.     It was a pleasure taking care of you, Father Lux. Thank you " for [facility].    1. General medical exam  -     Pulmonary function test  -     InBody Composition  -     Visual acuity screening  -     Hearing screen  -     EKG 12-lead  -     Uric Acid  -     Hepatitis C Antibody  -     CK  -     HIV 1/2 Ag/Ab (4th Gen)  -     Testosterone  -     ABO/Rh  -     Hemoglobin A1C  -     T3, Free (OLG)  -     T4, Free  -     Vitamin D  -     CRP, High Sensitivity  -     Homocysteine, Serum  -     PSA, Screening  -     Ferritin  -     TSH  -     Urinalysis, Reflex to Urine Culture  -     Lipid Panel  -     Comprehensive Metabolic Panel  -     CBC Auto Differential  -     Exercise Stress - EKG    2. Snoring  Assessment & Plan:  Weight loss should help resolve this issue. Consider a sleep study if your symptoms do not improve or worsen.       3. Allergic rhinitis, unspecified seasonality, unspecified trigger  Assessment & Plan:  Switch to Zyrtec 10mg daily.   Use over the counter nasal steroids (Nasonex or Flonase) 2 sprays to each nostril daily x 2-6 weeks for nasal congestion.   Seek medical care for significant sinus pain or pressure or fevers associated with these symptoms.       4. Rash and nonspecific skin eruption  Assessment & Plan:  Consider returning to Dr. Erwin's office for further evaluation and treatment of your unknown rash.       5. Heart burn  Assessment & Plan:  Use over the counter TUMS as needed.   Consider a trial of Nexium OTC, 1 capsule daily x 14 days if you find you need TUMS for multiple days in a row.       6. Deviated nasal septum  Assessment & Plan:  Consider an evaluation by an ENT to find out if it warrants a surgical correction.       7. Cigarette smoker  Assessment & Plan:  The recommendation is to quit smoking.                 Follow up in about 1 year (around 5/16/2024) for an [facility] Physical.

## 2023-05-16 NOTE — ASSESSMENT & PLAN NOTE
Switch to Zyrtec 10mg daily.   Use over the counter nasal steroids (Nasonex or Flonase) 2 sprays to each nostril daily x 2-6 weeks for nasal congestion.   Seek medical care for significant sinus pain or pressure or fevers associated with these symptoms.

## 2023-05-16 NOTE — ASSESSMENT & PLAN NOTE
Use over the counter TUMS as needed.   Consider a trial of Nexium OTC, 1 capsule daily x 14 days if you find you need TUMS for multiple days in a row.

## 2024-04-17 DIAGNOSIS — Z00.00 GENERAL MEDICAL EXAM: Primary | ICD-10-CM

## 2025-01-30 DIAGNOSIS — Z00.00 GENERAL MEDICAL EXAM: Primary | ICD-10-CM

## 2025-02-26 ENCOUNTER — OFFICE VISIT (OUTPATIENT)
Dept: INTERNAL MEDICINE | Facility: CLINIC | Age: 30
End: 2025-02-26
Payer: COMMERCIAL

## 2025-02-26 ENCOUNTER — RESULTS FOLLOW-UP (OUTPATIENT)
Dept: INTERNAL MEDICINE | Facility: CLINIC | Age: 30
End: 2025-02-26

## 2025-02-26 VITALS
BODY MASS INDEX: 33.43 KG/M2 | OXYGEN SATURATION: 99 % | SYSTOLIC BLOOD PRESSURE: 138 MMHG | WEIGHT: 208 LBS | HEIGHT: 66 IN | HEART RATE: 75 BPM | TEMPERATURE: 98 F | DIASTOLIC BLOOD PRESSURE: 80 MMHG

## 2025-02-26 DIAGNOSIS — E78.00 ELEVATED LDL CHOLESTEROL LEVEL: ICD-10-CM

## 2025-02-26 DIAGNOSIS — R06.83 SNORING: ICD-10-CM

## 2025-02-26 DIAGNOSIS — F17.210 CIGARETTE SMOKER: ICD-10-CM

## 2025-02-26 DIAGNOSIS — I10 PRIMARY HYPERTENSION: ICD-10-CM

## 2025-02-26 DIAGNOSIS — Z13.31 POSITIVE DEPRESSION SCREENING: ICD-10-CM

## 2025-02-26 DIAGNOSIS — F41.9 ANXIETY AND DEPRESSION: ICD-10-CM

## 2025-02-26 DIAGNOSIS — F32.A ANXIETY AND DEPRESSION: ICD-10-CM

## 2025-02-26 DIAGNOSIS — Z00.00 GENERAL MEDICAL EXAM: Primary | ICD-10-CM

## 2025-02-26 DIAGNOSIS — F51.01 PRIMARY INSOMNIA: ICD-10-CM

## 2025-02-26 LAB
(HCYS)2 SERPL-MCNC: 8.4 UMOL/L (ref 5.1–15.4)
25(OH)D3+25(OH)D2 SERPL-MCNC: 37 NG/ML (ref 30–80)
ALBUMIN SERPL-MCNC: 4.6 G/DL (ref 3.5–5)
ALBUMIN/GLOB SERPL: 1.2 RATIO (ref 1.1–2)
ALP SERPL-CCNC: 51 UNIT/L (ref 40–150)
ALT SERPL-CCNC: 49 UNIT/L (ref 0–55)
ANION GAP SERPL CALC-SCNC: 10 MEQ/L
AST SERPL-CCNC: 34 UNIT/L (ref 5–34)
BACTERIA #/AREA URNS AUTO: ABNORMAL /HPF
BASOPHILS # BLD AUTO: 0.06 X10(3)/MCL
BASOPHILS NFR BLD AUTO: 0.8 %
BILIRUB SERPL-MCNC: 0.5 MG/DL
BILIRUB UR QL STRIP.AUTO: NEGATIVE
BUN SERPL-MCNC: 17.4 MG/DL (ref 8.9–20.6)
CALCIUM SERPL-MCNC: 9.3 MG/DL (ref 8.4–10.2)
CHLORIDE SERPL-SCNC: 102 MMOL/L (ref 98–107)
CHOLEST SERPL-MCNC: 218 MG/DL
CHOLEST/HDLC SERPL: 5 {RATIO} (ref 0–5)
CK SERPL-CCNC: 325 U/L (ref 30–200)
CLARITY UR: CLEAR
CO2 SERPL-SCNC: 22 MMOL/L (ref 22–29)
COLOR UR AUTO: COLORLESS
CREAT SERPL-MCNC: 0.95 MG/DL (ref 0.72–1.25)
CREAT/UREA NIT SERPL: 18
CRP SERPL HS-MCNC: 2.42 MG/L
DLCO: NORMAL
EOSINOPHIL # BLD AUTO: 0.15 X10(3)/MCL (ref 0–0.9)
EOSINOPHIL NFR BLD AUTO: 2.1 %
ERYTHROCYTE [DISTWIDTH] IN BLOOD BY AUTOMATED COUNT: 11.9 % (ref 11.5–17)
EST. AVERAGE GLUCOSE BLD GHB EST-MCNC: 91.1 MG/DL
FERRITIN SERPL-MCNC: 205.53 NG/ML (ref 21.81–274.66)
FEV1/FVC: 83.9 %
FEV1: 3.98 LITERS
FVC: 4.74 LITERS
GFR SERPLBLD CREATININE-BSD FMLA CKD-EPI: >60 ML/MIN/1.73/M2
GLOBULIN SER-MCNC: 3.8 GM/DL (ref 2.4–3.5)
GLUCOSE SERPL-MCNC: 96 MG/DL (ref 74–100)
GLUCOSE UR QL STRIP: NORMAL
HBA1C MFR BLD: 4.8 %
HCT VFR BLD AUTO: 46.7 % (ref 42–52)
HCV AB SERPL QL IA: NONREACTIVE
HDLC SERPL-MCNC: 41 MG/DL (ref 35–60)
HGB BLD-MCNC: 16.4 G/DL (ref 14–18)
HGB UR QL STRIP: NEGATIVE
HIV 1+2 AB+HIV1 P24 AG SERPL QL IA: NONREACTIVE
HYALINE CASTS #/AREA URNS LPF: ABNORMAL /LPF
IMM GRANULOCYTES # BLD AUTO: 0.01 X10(3)/MCL (ref 0–0.04)
IMM GRANULOCYTES NFR BLD AUTO: 0.1 %
KETONES UR QL STRIP: NEGATIVE
LDLC SERPL CALC-MCNC: 161 MG/DL (ref 50–140)
LEUKOCYTE ESTERASE UR QL STRIP: NEGATIVE
LYMPHOCYTES # BLD AUTO: 3.18 X10(3)/MCL (ref 0.6–4.6)
LYMPHOCYTES NFR BLD AUTO: 44.6 %
MCH RBC QN AUTO: 30.1 PG (ref 27–31)
MCHC RBC AUTO-ENTMCNC: 35.1 G/DL (ref 33–36)
MCV RBC AUTO: 85.7 FL (ref 80–94)
MONOCYTES # BLD AUTO: 0.54 X10(3)/MCL (ref 0.1–1.3)
MONOCYTES NFR BLD AUTO: 7.6 %
NEUTROPHILS # BLD AUTO: 3.19 X10(3)/MCL (ref 2.1–9.2)
NEUTROPHILS NFR BLD AUTO: 44.8 %
NITRITE UR QL STRIP: NEGATIVE
NRBC BLD AUTO-RTO: 0 %
OHS QRS DURATION: 98 MS
OHS QTC CALCULATION: 407 MS
PH UR STRIP: 5.5 [PH]
PLATELET # BLD AUTO: 212 X10(3)/MCL (ref 130–400)
PMV BLD AUTO: 10.1 FL (ref 7.4–10.4)
POTASSIUM SERPL-SCNC: 3.9 MMOL/L (ref 3.5–5.1)
PROT SERPL-MCNC: 8.4 GM/DL (ref 6.4–8.3)
PROT UR QL STRIP: NEGATIVE
PSA SERPL-MCNC: 0.25 NG/ML
RBC # BLD AUTO: 5.45 X10(6)/MCL (ref 4.7–6.1)
RBC #/AREA URNS AUTO: ABNORMAL /HPF
SODIUM SERPL-SCNC: 134 MMOL/L (ref 136–145)
SP GR UR STRIP.AUTO: 1.01 (ref 1–1.03)
SQUAMOUS #/AREA URNS LPF: ABNORMAL /HPF
T3FREE SERPL-MCNC: 3.38 PG/ML (ref 1.58–3.91)
T4 FREE SERPL-MCNC: 1.18 NG/DL (ref 0.7–1.48)
TESTOST SERPL-MCNC: 735.13 NG/DL (ref 240.24–870.68)
TLC: NORMAL
TRIGL SERPL-MCNC: 82 MG/DL (ref 34–140)
TSH SERPL-ACNC: 1.01 UIU/ML (ref 0.35–4.94)
URATE SERPL-MCNC: 5.7 MG/DL (ref 3.5–7.2)
UROBILINOGEN UR STRIP-ACNC: NORMAL
VLDLC SERPL CALC-MCNC: 16 MG/DL
WBC # BLD AUTO: 7.13 X10(3)/MCL (ref 4.5–11.5)
WBC #/AREA URNS AUTO: ABNORMAL /HPF

## 2025-02-26 PROCEDURE — 84439 ASSAY OF FREE THYROXINE: CPT | Performed by: FAMILY MEDICINE

## 2025-02-26 PROCEDURE — 84153 ASSAY OF PSA TOTAL: CPT | Performed by: FAMILY MEDICINE

## 2025-02-26 PROCEDURE — 87389 HIV-1 AG W/HIV-1&-2 AB AG IA: CPT | Performed by: FAMILY MEDICINE

## 2025-02-26 PROCEDURE — 86803 HEPATITIS C AB TEST: CPT | Performed by: FAMILY MEDICINE

## 2025-02-26 PROCEDURE — 82550 ASSAY OF CK (CPK): CPT | Performed by: FAMILY MEDICINE

## 2025-02-26 PROCEDURE — 81001 URINALYSIS AUTO W/SCOPE: CPT | Performed by: FAMILY MEDICINE

## 2025-02-26 PROCEDURE — 85025 COMPLETE CBC W/AUTO DIFF WBC: CPT | Performed by: FAMILY MEDICINE

## 2025-02-26 PROCEDURE — 84550 ASSAY OF BLOOD/URIC ACID: CPT | Performed by: FAMILY MEDICINE

## 2025-02-26 PROCEDURE — 80053 COMPREHEN METABOLIC PANEL: CPT | Performed by: FAMILY MEDICINE

## 2025-02-26 PROCEDURE — 83090 ASSAY OF HOMOCYSTEINE: CPT | Performed by: FAMILY MEDICINE

## 2025-02-26 PROCEDURE — 80061 LIPID PANEL: CPT | Performed by: FAMILY MEDICINE

## 2025-02-26 PROCEDURE — 93005 ELECTROCARDIOGRAM TRACING: CPT | Mod: ,,, | Performed by: FAMILY MEDICINE

## 2025-02-26 PROCEDURE — 86141 C-REACTIVE PROTEIN HS: CPT | Performed by: FAMILY MEDICINE

## 2025-02-26 PROCEDURE — 82728 ASSAY OF FERRITIN: CPT | Performed by: FAMILY MEDICINE

## 2025-02-26 PROCEDURE — 84481 FREE ASSAY (FT-3): CPT | Performed by: FAMILY MEDICINE

## 2025-02-26 PROCEDURE — 84443 ASSAY THYROID STIM HORMONE: CPT | Performed by: FAMILY MEDICINE

## 2025-02-26 PROCEDURE — 83036 HEMOGLOBIN GLYCOSYLATED A1C: CPT | Performed by: FAMILY MEDICINE

## 2025-02-26 PROCEDURE — 82306 VITAMIN D 25 HYDROXY: CPT | Performed by: FAMILY MEDICINE

## 2025-02-26 PROCEDURE — 84403 ASSAY OF TOTAL TESTOSTERONE: CPT | Performed by: FAMILY MEDICINE

## 2025-02-26 RX ORDER — ZOLPIDEM TARTRATE 10 MG/1
1 TABLET ORAL NIGHTLY
COMMUNITY

## 2025-02-26 RX ORDER — AMLODIPINE BESYLATE 10 MG/1
1 TABLET ORAL DAILY
COMMUNITY
Start: 2023-10-31

## 2025-02-26 RX ORDER — SERTRALINE HYDROCHLORIDE 50 MG/1
25 TABLET, FILM COATED ORAL DAILY
COMMUNITY

## 2025-02-26 RX ORDER — FEXOFENADINE HYDROCHLORIDE 180 MG/1
180 TABLET, FILM COATED ORAL EVERY OTHER DAY
COMMUNITY
Start: 2025-02-18

## 2025-02-26 NOTE — ASSESSMENT & PLAN NOTE
Controlled. Continue your current medication and follow up with your psychiatrist and  as planned.

## 2025-02-26 NOTE — ASSESSMENT & PLAN NOTE
Continue care with psychiatrist.   Do not drink alcohol on this medication. Get your sleep study to rule out sleep apnea. Use with caution.

## 2025-02-26 NOTE — PROGRESS NOTES
[facility]  Faby Major MD      Subjective      Salinas Wasserman is a 29 y.o. year old male, who presents today for a preventive medical evaluation.      Fr. Tim Wasserman reports since his last [facility] physical he has had no major illnesses or injuries.   Since our last visit he has been started on blood pressure medication for hypertension and an SSRI for his anxiety. He was started on Ambien nightly for insomnia. He is being followed by a psychiatrist and sees a counselor regularly. His mood is improved and he is tolerating his medication.     He reports nightly loud snoring and episodes of choking. He reports significant daytime sleepiness. He is interested in a sleep study to rule out PETERSON.      CURRENT MEDICAL PROBLEMS     He has chronic allergic rhinitis for which he takes over the counter antihistamines. He has longstanding anxiety and takes a daily SSRI. He has hypertension and takes daily medication. He has chronic insomnia and takes Ambien nightly.      Current Medications    Current Outpatient Medications:     ALLERGY RELIEF, FEXOFENADINE, 180 mg tablet, Take 180 mg by mouth every other day., Disp: , Rfl:     amLODIPine (NORVASC) 10 MG tablet, Take 1 tablet by mouth once daily., Disp: , Rfl:     multivitamin capsule, Take 1 capsule by mouth once daily., Disp: , Rfl:     sertraline (ZOLOFT) 50 MG tablet, Take 25 mg by mouth once daily., Disp: , Rfl:     zolpidem (AMBIEN) 10 mg Tab, Take 1 tablet by mouth every evening., Disp: , Rfl:      Care Team 274}  Patient Care Team:  Shoaib Boyle MD as PCP - General (Family Medicine)  Shoaib Boyle MD (Family Medicine)  Chantal Villagomez as Audiologist  Cheri Yañez, PhD (Psychology)      PAST MEDICAL HISTORY   274}  Immunizations:  He has not his tetanus booster in the last 10 years.  He has not received the annual flu vaccine.  He reports he has not taken Hepatitis B vaccine.  He  has taken his Covid Vaccine.     Drug  Allergies:274}  Review of patient's allergies indicates:  No Known Allergies     Medical Illnesses:  See Current Medical Problems and Review of System.    Surgical Illnesses:   Past Surgical History:   Procedure Laterality Date    WISDOM TOOTH EXTRACTION          Diagnostic Studies (year completed):  EKG,   Treadmill Stress Test,   CT of Abdomen/Pelvis,     SOCIAL HISTORY     Fr. Salinas Wasserman is a  at [employer] in [place].     Personal Habits  He smokes 4 cigarettes a day and started in . This is down from 6 cigarettes a day in . This is a 3.3 pack year history. He has stopped using smokeless tobacco.  He drinks approximately 15 alcoholic beverages per week. He drinks 1-3 cups of coffee or tea a week. He reports that he is following a low sodium diet. He eats majority of his meals outside of the home. He drinks 14 glasses of water a week. On average, he obtains 5-6 hours of sleep each night.     Exercise History  Currently, he exercises on average 7 days a week for 30 minutes each time. For aerobic activity he uses the stair master, 7 days per week. For resistance training he lifts weights, 4 days per week. He is not stretching before or after exercise.     Stress Factors  The patient considers his home life to be moderate stress and his occupation to be of moderate stress. His greatest source of worry or concern is his job and health. He feels that he manages his stress fairly well most of the time. He rates his emotional outlook on life as generally happy. He rates his overall health as good.    FAMILY HISTORY     The patient's father  at the age of 78. He had hypertension, anxiety, spinal stenosis and a stroke. His mother is about 61 years old. She has hypertension, high cholesterol and scoliosis. He has 2 siblings, ages 36 and 35 who are both healthy.     REVIEW OF SYSTEMS     Review of Systems   Constitutional: No fever, no chills, no sweats, no weakness,  "no fatigue  Eye: No recent visual problems, no blurry vision, no visual disturbances  ENT: No ear pain, + decrease hearing in left ear, no nasal congestion, no sore throat  Respiratory: No shortness of breath, no cough, no wheezing, + snoring, +stuffy nose  Cardiovascular: No chest pain, no palpitations, no peripheral edema, no calf pain or swelling  Gastrointestinal: No nausea, no vomiting, no diarrhea, no constipation, no abdominal pain, no melena or rectal bleeding  Male : No difficulty with urine stream, strength or flow rate. No polyuria, no nocturia, no sexual problems  Genitourinary: No dysuria, no hematuria, no excessive urination, no frequent UTIs, no urinary incontinence  Immunologic: No recurrent fevers, no malaise  Hematology/lymphatics: No swollen lymph glands  Musculoskeletal: No joint pain, no joint swelling, no trouble with gait.   Integumentary: No rashes, no skin lesions  Neurologic: no abnormal balance, no confusion, no numbness, no tingling, no headache  Psychiatric: no anxiety, no depression, + sleeping problems        Objective     PHYSICAL EXAM   274}  /80 (BP Location: Right arm, Patient Position: Sitting)   Pulse 75   Temp 97.7 °F (36.5 °C) (Oral)   Ht 5' 6" (1.676 m)   Wt 94.3 kg (208 lb)   SpO2 99%   BMI 33.57 kg/m²     Physical Exam   General: Alert and oriented, no acute distress. well nourished, well kept, good hydration  Eye: Pupils are equal, round and reactive to light, extraocular movements are intact  HEENT: OP clear, moist mucus membranes, no cervical LAD, ear canals clear- normal TM bilaterally, +deviated septum to the left side with a narrowed nasal passage on the left side as well.   Respiratory: Lungs are clear to auscultation, breath sounds are equal and symmetric  Cardiovascular: Normal rate, regular rhythm, no murmurs, no gallop, no edema  Gastrointestinal: Soft, nontender, nondistended, normal bowel sounds, no organomegaly  Genitourinary: No CVA " tenderness  Musculoskeletal: Normal range of motion, no swelling, normal gait.  Integumentary: Warm, dry, pink, no rash  Neurologic: Alert, oriented, no focal deficits, normal reflexes  Psychiatric: Cooperative, appropriate mood and affect, normal judgment, nonsuicidal    LABORATORY RESULTS    274}    Office Visit on 02/26/2025   Component Date Value Ref Range Status    Vitamin D 02/26/2025 37  30 - 80 ng/mL Final    Uric Acid 02/26/2025 5.7  3.5 - 7.2 mg/dL Final    Color, UA 02/26/2025 Colorless (A)  Yellow, Light-Yellow, Dark Yellow, Ling, Straw Final    Appearance, UA 02/26/2025 Clear  Clear Final    Specific Gravity, UA 02/26/2025 1.008  1.005 - 1.030 Final    pH, UA 02/26/2025 5.5  5.0 - 8.5 Final    Protein, UA 02/26/2025 Negative  Negative Final    Glucose, UA 02/26/2025 Normal  Negative, Normal Final    Ketones, UA 02/26/2025 Negative  Negative Final    Blood, UA 02/26/2025 Negative  Negative Final    Bilirubin, UA 02/26/2025 Negative  Negative Final    Urobilinogen, UA 02/26/2025 Normal  0.2, 1.0, Normal Final    Nitrites, UA 02/26/2025 Negative  Negative Final    Leukocyte Esterase, UA 02/26/2025 Negative  Negative Final    RBC, UA 02/26/2025 0-5  None Seen, 0-2, 3-5, 0-5 /HPF Final    WBC, UA 02/26/2025 0-5  None Seen, 0-2, 3-5, 0-5 /HPF Final    Bacteria, UA 02/26/2025 None Seen  None Seen /HPF Final    Squamous Epithelial Cells, UA 02/26/2025 None Seen  None Seen /HPF Final    Hyaline Casts, UA 02/26/2025 None Seen  None Seen /lpf Final    TSH 02/26/2025 1.011  0.350 - 4.940 uIU/mL Final    Testosterone Total 02/26/2025 735.13  240.24 - 870.68 ng/dL Final    Thyroxine Free 02/26/2025 1.18  0.70 - 1.48 ng/dL Final    T3 Free 02/26/2025 3.38  1.58 - 3.91 pg/mL Final    Prostate Specific Antigen 02/26/2025 0.25  <=4.00 ng/mL Final    Cholesterol Total 02/26/2025 218 (H)  <=200 mg/dL Final    HDL Cholesterol 02/26/2025 41  35 - 60 mg/dL Final    Triglyceride 02/26/2025 82  34 - 140 mg/dL Final     Cholesterol/HDL Ratio 02/26/2025 5  0 - 5 Final    Very Low Density Lipoprotein 02/26/2025 16   Final    LDL Cholesterol 02/26/2025 161.00 (H)  50.00 - 140.00 mg/dL Final    LDL calculated using the Friedewald equation.    Homocysteine 02/26/2025 8.4  5.1 - 15.4 umol/L Final    HIV 02/26/2025 Nonreactive  Nonreactive Final    Hep C Ab Interp 02/26/2025 Nonreactive  Nonreactive Final    Hemoglobin A1c 02/26/2025 4.8  <=7.0 % Final    Estimated Average Glucose 02/26/2025 91.1  mg/dL Final    Ferritin Level 02/26/2025 205.53  21.81 - 274.66 ng/mL Final    Sodium 02/26/2025 134 (L)  136 - 145 mmol/L Final    Potassium 02/26/2025 3.9  3.5 - 5.1 mmol/L Final    Chloride 02/26/2025 102  98 - 107 mmol/L Final    CO2 02/26/2025 22  22 - 29 mmol/L Final    Glucose 02/26/2025 96  74 - 100 mg/dL Final    Blood Urea Nitrogen 02/26/2025 17.4  8.9 - 20.6 mg/dL Final    Creatinine 02/26/2025 0.95  0.72 - 1.25 mg/dL Final    Calcium 02/26/2025 9.3  8.4 - 10.2 mg/dL Final    Protein Total 02/26/2025 8.4 (H)  6.4 - 8.3 gm/dL Final    Albumin 02/26/2025 4.6  3.5 - 5.0 g/dL Final    Globulin 02/26/2025 3.8 (H)  2.4 - 3.5 gm/dL Final    Albumin/Globulin Ratio 02/26/2025 1.2  1.1 - 2.0 ratio Final    Bilirubin Total 02/26/2025 0.5  <=1.5 mg/dL Final    ALP 02/26/2025 51  40 - 150 unit/L Final    ALT 02/26/2025 49  0 - 55 unit/L Final    AST 02/26/2025 34  5 - 34 unit/L Final    eGFR 02/26/2025 >60  mL/min/1.73/m2 Final    Estimated GFR calculated using the CKD-EPI creatinine (2021) equation.    Anion Gap 02/26/2025 10.0  mEq/L Final    BUN/Creatinine Ratio 02/26/2025 18   Final    Creatine Kinase 02/26/2025 325 (H)  30 - 200 U/L Final    WBC 02/26/2025 7.13  4.50 - 11.50 x10(3)/mcL Final    RBC 02/26/2025 5.45  4.70 - 6.10 x10(6)/mcL Final    Hgb 02/26/2025 16.4  14.0 - 18.0 g/dL Final    Hct 02/26/2025 46.7  42.0 - 52.0 % Final    MCV 02/26/2025 85.7  80.0 - 94.0 fL Final    MCH 02/26/2025 30.1  27.0 - 31.0 pg Final    MCHC 02/26/2025  35.1  33.0 - 36.0 g/dL Final    RDW 02/26/2025 11.9  11.5 - 17.0 % Final    Platelet 02/26/2025 212  130 - 400 x10(3)/mcL Final    MPV 02/26/2025 10.1  7.4 - 10.4 fL Final    Neut % 02/26/2025 44.8  % Final    Lymph % 02/26/2025 44.6  % Final    Mono % 02/26/2025 7.6  % Final    Eos % 02/26/2025 2.1  % Final    Basophil % 02/26/2025 0.8  % Final    Imm Grans % 02/26/2025 0.1  % Final    Neut # 02/26/2025 3.19  2.1 - 9.2 x10(3)/mcL Final    Lymph # 02/26/2025 3.18  0.6 - 4.6 x10(3)/mcL Final    Mono # 02/26/2025 0.54  0.1 - 1.3 x10(3)/mcL Final    Eos # 02/26/2025 0.15  0 - 0.9 x10(3)/mcL Final    Baso # 02/26/2025 0.06  <=0.2 x10(3)/mcL Final    Imm Gran # 02/26/2025 0.01  0.00 - 0.04 x10(3)/mcL Final    NRBC% 02/26/2025 0.0  % Final    FEV1 02/26/2025 3.98  liters Final    FVC 02/26/2025 4.74  liters Final    FEV1/FVC 02/26/2025 83.9  % Final       Any results pending at the time of completion of note will be routed to the physician and patient for review and outreach performed if indicated.       IN OFFICE TEST RESULTS     Vision/Auditory  Hearing Screening    500Hz 1000Hz 2000Hz 4000Hz   Right ear 25 25 25 25   Left ear Fail Fail 25 25     Vision Screening    Right eye Left eye Both eyes   Without correction   20/30   With correction      Comments: Far Acuity-20/30  Near Acuity-20/20      Spirometry   FVC 98.6% predictive  FEV1 100.7% predictive  FEV1/.8% predictive  Interpretation: Normal Spirometry    Resting ECG:  Vent. Rate :  54 BPM     Atrial Rate :  54 BPM      P-R Int : 150 ms          QRS Dur :  98 ms       QT Int : 430 ms       P-R-T Axes :  35  74  50 degrees     QTcB Int : 407 ms     Sinus bradycardia   Right ventricular conduction delay   Borderline Abnormal ECG       Exercise Treadmill Stress Test    The ECG portion of the study is negative for ischemia.    The patient reported no chest pain during the stress test.    There were no arrhythmias during stress.    There was a hypertensive  response to exercise.      InBody Assessment:   02/26/2025 Previous results: 5/16/2023   BMI: 33.6 BMI: 33.6  Percent Body Fat: 32.6 %  Visceral Fat Area: 127.6 cm2  Recommendations: Body fat mass loss 43.2lbs   Percent Body Fat: 31.7%    Visceral Fat Area: 116.9 cm2    Recommendations: Body fat mass loss of 41lbs          Assessment    IMPRESSION & PLAN    274}  Cancer Screening   Impression   All recommended cancer screening exams are up to date.    Plan Continue yearly wellness visits as planned.      Immunizations   Impression Not all recommended immunizations are up to date. These include: Influenza vaccine and Tetanus, diphtheria and pertussis vaccines   Plan It is recommended that you speak to your primary care physician about receiving your vaccinations.      Physical Exam   Impression Normal except for:   -Elevated BMI: work towards a weight loss of 20lbs over the next year   Plan See above       Laboratory Results   Impression Normal except for:   -decreased sodium: likely due to zoloft. Discuss with psychiatrist. Recommend repeat periodically while on this medication.   -elevated LDL: with risk score being intermediate, recommend statin therapy  -CPK elevated: cause unknown. Consider repeating sooner than next year.   Plan See above.      Hearing Screening   Impression Abnormal in left ear.    Plan Repeat in 1 year. See audiology as planned. Recommend wearing ear protection when exposed to excessively loud environment to reduce further hearing loss.      Vision Screening    Impression Normal   Plan Repeat screening in 1 year. Get a formal eye exam from an optometrist or ophthalmologist.      Lung Function Screening   Impression Normal   Plan Repeat in 1 year.     ECG & Exercise Treadmill Stress Test   Impression See Impression reported above.   Plan Repeat in 1 year.     Cardiovascular Risk Assessment   Impression Mammoth Risk score: 5.8%   Borderline Risk (5 to 7.4%)  The Mammoth Risk Score is a  cardiovascular risk calculator that predicts the likelihood of developing cardiovascular disease--such as heart attacks or strokes--in the next 10 years.    Plan Follow a heart healthy diet (see details below), maintain a healthy blood pressure (<140/80) and weight (BMI 25-29.9). Follow a regular aerobic exercise routine (see exercise prescription below)  Stop smoking. Reach out to your primary care physician for treatment options or consider joining a smoking cessation program. , Start daily statin therapy. , and Consider a diet such as the Dietary Approaches to Stop Hypertension (DASH) diet which can help to lower blood pressure.     In Body Assessment   Impression  See results and recommendations above   Plan Exercise Prescription         Type: Aerobic: walking, stationary cycling, aquatic exercise, running         Frequency: 3-5 sessions/week         Intensity: Moderate (RPE: 10-13)          Time: 30 minutes (150 minutes/week)              Plus         Type: Strength/Resistance Training: exercises using resistance bands, weight machines, handheld  weights or body weight         Frequency: 2-3 session/week               Plus         Type: Flexibility training: pre/post workout stretching, yoga, kathleen chi         Frequency: 5-7 sessions/week         Time: 5-10 minutes       Plan Diet Recommendations:  Follow a heart healthy diet such as the Mediterranean-style diet.   Eat an overall healthy dietary pattern that emphasizes:   -a wide variety of fruits and vegetables   -whole grains and products made up mostly of whole grains   -healthy sources of protein (mostly plants such as legumes and nuts; fish and seafood; low fat or nonfat dairy; and, if you eat meat and poultry, ensuring it is lean and unprocessed)   -liquid non-tropical vegetable oils   -minimally processed foods   -minimized intake of added sugars   -food prepared with little or no salt   -limited or preferably no alcohol intake        TDEE: 2,725  "calories  This is your "Total Daily Energy Expenditure" and is the amount of calories required to maintain current body weight when your activity level is factored in.    Recommended daily calorie intake to promote weight loss with moderate exercise (3-5 days/week): 2,221 calories/day    BMR: 1,763 calories  This is your "Basal Metabolic Rate". This is the amount of calories required to maintain current body weight with no activity.         MEDICAL PROBLEMS ADDRESSED TODAY     1. General medical exam  -     See above    2. Snoring  Assessment & Plan:  Will refer for sleep study.    Neck circumference: 17in    Orders:  -     Home Sleep Study; Future    3. Primary hypertension  Assessment & Plan:  New diagnosis. Continue your current medication and follow up with your doctor as planned.     4. Anxiety and depression  Assessment & Plan:  Controlled. Continue your current medication and follow up with your psychiatrist and  as planned.     5. Primary insomnia  Assessment & Plan:  Continue care with psychiatrist.   Do not drink alcohol on this medication. Get your sleep study to rule out sleep apnea. Use with caution.     6. Cigarette smoker  Assessment & Plan:  The recommendation is to quit smoking.    7. Positive depression screening  Comments:  I have reviewed the positive depression score with the patient and found that no additional intervention is needed at this time. sees psych and counselor    8. Elevated LDL cholesterol level  Assessment & Plan:  Recommend starting a statin due to intermediate risk, elevated hs-CRP and LDL of 160s.      FINAL RECOMMENDATIONS     Continue to cut back on your smoking with goal to stop.   Recommend starting a statin medication to lower your cholesterol. Talk to your doctor about this soon.    Work towards a weight loss of 20lbs over the next year.   Increase your exercise routine to allow for at least 30min of moderate intensity exercise at at time with a total of at least " 150min/week. See Exercise routine above.   Get a home sleep study. The order was placed today.     Early identification and prevention are the keys to maintaining overall health and prevention of chronic diseases. For this reason, I recommend yearly physical examinations through this program or with your primary care physician.     Follow up in about 1 year (around 2/26/2026) for an [facility] Physical.    It was a pleasure taking care of you, Fr. Dumont. Thank you for using the [facility].            [facility]  [address]   [place], [place]  [address]                    I have reviewed the patient's positive depression score. After discussing with the patient, I have determined that no other intervention is needed at this time.

## 2025-02-27 ENCOUNTER — TELEPHONE (OUTPATIENT)
Dept: INTERNAL MEDICINE | Facility: CLINIC | Age: 30
End: 2025-02-27
Payer: COMMERCIAL

## 2025-02-27 ENCOUNTER — TELEPHONE (OUTPATIENT)
Dept: SLEEP MEDICINE | Facility: HOSPITAL | Age: 30
End: 2025-02-27
Payer: COMMERCIAL

## 2025-02-27 DIAGNOSIS — G47.33 OBSTRUCTIVE SLEEP APNEA: Primary | ICD-10-CM

## 2025-02-27 NOTE — TELEPHONE ENCOUNTER
Called Neuro center and spoke with Grace,  and will call patient to schedule sleep study.  Patient notified.

## 2025-02-27 NOTE — TELEPHONE ENCOUNTER
Per PCP note, pt has significant EDS, snores loudly and has choking episodes in his sleep. Will proceed with HST

## 2025-03-06 ENCOUNTER — TELEPHONE (OUTPATIENT)
Dept: INTERNAL MEDICINE | Facility: CLINIC | Age: 30
End: 2025-03-06
Payer: COMMERCIAL

## 2025-03-10 ENCOUNTER — PROCEDURE VISIT (OUTPATIENT)
Dept: SLEEP MEDICINE | Facility: HOSPITAL | Age: 30
End: 2025-03-10
Payer: COMMERCIAL

## 2025-03-10 DIAGNOSIS — G47.33 OBSTRUCTIVE SLEEP APNEA: ICD-10-CM

## 2025-04-01 ENCOUNTER — TELEPHONE (OUTPATIENT)
Dept: INTERNAL MEDICINE | Facility: CLINIC | Age: 30
End: 2025-04-01
Payer: COMMERCIAL

## 2025-04-01 NOTE — TELEPHONE ENCOUNTER
Sharita Candelario, [employer] rep., visited the [facility] clinic requesting a signed order for a sleep study.  Patient was referred to the [facility] by Dr. Major at his most recent [facility] physical.  In reviewing his chart, he visited the [facility] on 3/10/[address]89 and it is noted that there was no charge for sleep testing due to Insufficient data. Sharmaine Wasserman is requesting another sleep study to be completed with [facility].

## 2025-04-04 DIAGNOSIS — R06.83 SNORING: Primary | ICD-10-CM

## 2025-05-27 ENCOUNTER — TELEPHONE (OUTPATIENT)
Dept: INTERNAL MEDICINE | Facility: CLINIC | Age: 30
End: 2025-05-27
Payer: COMMERCIAL